# Patient Record
Sex: FEMALE | Race: WHITE | NOT HISPANIC OR LATINO | URBAN - METROPOLITAN AREA
[De-identification: names, ages, dates, MRNs, and addresses within clinical notes are randomized per-mention and may not be internally consistent; named-entity substitution may affect disease eponyms.]

---

## 2020-06-02 ENCOUNTER — TELEPHONE ENCOUNTER (OUTPATIENT)
Dept: URBAN - METROPOLITAN AREA CLINIC 23 | Facility: CLINIC | Age: 45
End: 2020-06-02

## 2020-06-02 RX ORDER — OMEPRAZOLE 40 MG/1
1 CAPSULE 30 MINUTES BEFORE MORNING MEAL CAPSULE, DELAYED RELEASE ORAL ONCE A DAY
Qty: 30 | OUTPATIENT
Start: 2020-06-03

## 2020-06-09 ENCOUNTER — TELEPHONE ENCOUNTER (OUTPATIENT)
Dept: URBAN - METROPOLITAN AREA CLINIC 23 | Facility: CLINIC | Age: 45
End: 2020-06-09

## 2020-06-27 ENCOUNTER — TELEPHONE ENCOUNTER (OUTPATIENT)
Dept: URBAN - METROPOLITAN AREA CLINIC 92 | Facility: CLINIC | Age: 45
End: 2020-06-27

## 2020-06-27 RX ORDER — PLECANATIDE 3 MG/1
1 TABLET TABLET ORAL ONCE A DAY
Qty: 90 | Refills: 1 | OUTPATIENT
Start: 2020-06-27

## 2020-07-08 ENCOUNTER — OFFICE VISIT (OUTPATIENT)
Dept: URBAN - METROPOLITAN AREA TELEHEALTH 2 | Facility: TELEHEALTH | Age: 45
End: 2020-07-08
Payer: COMMERCIAL

## 2020-07-08 DIAGNOSIS — K21.9 GERD: ICD-10-CM

## 2020-07-08 DIAGNOSIS — K59.01 CONSTIPATION: ICD-10-CM

## 2020-07-08 DIAGNOSIS — K62.89 ANAL PAIN: ICD-10-CM

## 2020-07-08 DIAGNOSIS — K60.2 ANAL FISSURE: ICD-10-CM

## 2020-07-08 PROCEDURE — 1036F TOBACCO NON-USER: CPT | Performed by: INTERNAL MEDICINE

## 2020-07-08 PROCEDURE — G9903 PT SCRN TBCO ID AS NON USER: HCPCS | Performed by: INTERNAL MEDICINE

## 2020-07-08 PROCEDURE — 99213 OFFICE O/P EST LOW 20 MIN: CPT | Performed by: INTERNAL MEDICINE

## 2020-07-08 PROCEDURE — G8420 CALC BMI NORM PARAMETERS: HCPCS | Performed by: INTERNAL MEDICINE

## 2020-07-08 PROCEDURE — G8427 DOCREV CUR MEDS BY ELIG CLIN: HCPCS | Performed by: INTERNAL MEDICINE

## 2020-07-08 RX ORDER — PLECANATIDE 3 MG/1
1 TABLET TABLET ORAL ONCE A DAY
Qty: 90 | Refills: 1 | Status: ACTIVE | COMMUNITY
Start: 2020-06-27

## 2020-07-08 RX ORDER — DEXLANSOPRAZOLE 60 MG/1
TAKE 1 CAPSULE (60 MG) BY ORAL ROUTE ONCE DAILY CAPSULE, DELAYED RELEASE ORAL 1
Qty: 90 | Refills: 3 | Status: ACTIVE | COMMUNITY
Start: 2020-05-28

## 2020-07-08 RX ORDER — OMEPRAZOLE 40 MG/1
1 CAPSULE 30 MINUTES BEFORE MORNING MEAL CAPSULE, DELAYED RELEASE ORAL ONCE A DAY
Qty: 30 | Status: ACTIVE | COMMUNITY
Start: 2020-06-03

## 2020-07-08 RX ORDER — EVE PRIMROSE/LINOLEIC/G-LENIC 1000 MG
CAPSULE ORAL
Qty: 0 | Refills: 0 | Status: ACTIVE | COMMUNITY
Start: 1900-01-01

## 2020-07-08 RX ORDER — FLUTICASONE PROPIONATE 50 UG/1
SPRAY, METERED NASAL
Qty: 0 | Refills: 0 | Status: ACTIVE | COMMUNITY
Start: 1900-01-01

## 2020-07-08 RX ORDER — OMEPRAZOLE 40 MG/1
1 CAPSULE 30 MINUTES BEFORE MORNING MEAL CAPSULE, DELAYED RELEASE ORAL ONCE A DAY
Qty: 90 | Refills: 1 | OUTPATIENT
Start: 2020-07-08

## 2020-07-08 RX ORDER — LUBIPROSTONE 24 UG/1
1 CAPSULE WITH FOOD AND WATER CAPSULE, GELATIN COATED ORAL TWICE A DAY
Qty: 180 | Refills: 1 | OUTPATIENT
Start: 2020-07-08

## 2020-07-08 RX ORDER — LUBIPROSTONE 24 UG/1
TAKE 1 CAPSULE (24 MCG) BY ORAL ROUTE 2 TIMES PER DAY WITH FOOD AND WATER CAPSULE, GELATIN COATED ORAL 2
Qty: 60 | Refills: 0 | Status: ACTIVE | COMMUNITY
Start: 2020-05-28

## 2020-07-08 RX ORDER — DOCUSATE SODIUM 100 MG/1
CAPSULE ORAL
Qty: 0 | Refills: 0 | Status: ACTIVE | COMMUNITY
Start: 1900-01-01

## 2020-07-08 RX ORDER — METAXALONE 800 MG
TABLET ORAL
Qty: 0 | Refills: 0 | Status: ACTIVE | COMMUNITY
Start: 1900-01-01

## 2020-07-08 RX ORDER — LINACLOTIDE 145 UG/1
TAKE 1 CAPSULE (145 MCG) BY ORAL ROUTE ONCE DAILY ON AN EMPTY STOMACH AT LEAST 30 MINUTES BEFORE 1ST MEAL OF THE DAY CAPSULE, GELATIN COATED ORAL 1
Qty: 90 | Refills: 0 | Status: ACTIVE | COMMUNITY
Start: 2020-05-26

## 2020-07-08 NOTE — HPI-TODAY'S VISIT:
she went to see Dr. Torres- she had the fissure repair 3.5 weeks ago.  states that things feel much better down there.  she started the amitiza - got a discount coupon which helps.  she feels that the amitiza 24 mcg  is working great for her, she is not having to use any miralax. she is very happy on this medication- she has been taking it for a month.  feels much better.  she is having better emptying.  no constipation.  no abdominal pain or fullness.  she states that on the pirlosec she has control of her reflux. she has to take the pepcid intermittently, as long as she takes the prilosec .  she is keeping track fo her food triggers and avoiding them.  avoiding red sauces.  she has more stress now with school- feels that she is getting some exercise but enough

## 2020-07-24 ENCOUNTER — TELEPHONE ENCOUNTER (OUTPATIENT)
Dept: URBAN - METROPOLITAN AREA CLINIC 23 | Facility: CLINIC | Age: 45
End: 2020-07-24

## 2020-07-24 RX ORDER — OMEPRAZOLE 40 MG/1
1 CAPSULE 30 MINUTES BEFORE MORNING MEAL CAPSULE, DELAYED RELEASE ORAL ONCE A DAY
Qty: 90 | Refills: 1
Start: 2020-07-08

## 2022-08-10 ENCOUNTER — OFFICE VISIT (OUTPATIENT)
Dept: URBAN - METROPOLITAN AREA CLINIC 126 | Facility: CLINIC | Age: 47
End: 2022-08-10
Payer: COMMERCIAL

## 2022-08-10 ENCOUNTER — DASHBOARD ENCOUNTERS (OUTPATIENT)
Age: 47
End: 2022-08-10

## 2022-08-10 VITALS
DIASTOLIC BLOOD PRESSURE: 90 MMHG | HEIGHT: 63 IN | WEIGHT: 193.8 LBS | TEMPERATURE: 97.7 F | HEART RATE: 88 BPM | SYSTOLIC BLOOD PRESSURE: 120 MMHG | BODY MASS INDEX: 34.34 KG/M2

## 2022-08-10 DIAGNOSIS — R10.11 RUQ PAIN: ICD-10-CM

## 2022-08-10 DIAGNOSIS — K21.9 GASTROESOPHAGEAL REFLUX DISEASE, UNSPECIFIED WHETHER ESOPHAGITIS PRESENT: ICD-10-CM

## 2022-08-10 DIAGNOSIS — R14.0 BLOATING: ICD-10-CM

## 2022-08-10 DIAGNOSIS — K58.1 IRRITABLE BOWEL SYNDROME WITH CONSTIPATION: ICD-10-CM

## 2022-08-10 PROCEDURE — 99214 OFFICE O/P EST MOD 30 MIN: CPT | Performed by: NURSE PRACTITIONER

## 2022-08-10 RX ORDER — FUROSEMIDE 20 MG/1
1 TABLET TABLET ORAL ONCE A DAY
Status: ACTIVE | COMMUNITY

## 2022-08-10 RX ORDER — PLECANATIDE 3 MG/1
1 TABLET TABLET ORAL ONCE A DAY
Qty: 90 | Refills: 1 | Status: ON HOLD | COMMUNITY
Start: 2020-06-27

## 2022-08-10 RX ORDER — METAXALONE 800 MG
TABLET ORAL
Qty: 0 | Refills: 0 | Status: ACTIVE | COMMUNITY
Start: 1900-01-01

## 2022-08-10 RX ORDER — FLUTICASONE PROPIONATE 50 UG/1
SPRAY, METERED NASAL
Qty: 0 | Refills: 0 | Status: ACTIVE | COMMUNITY
Start: 1900-01-01

## 2022-08-10 RX ORDER — LINACLOTIDE 145 UG/1
TAKE 1 CAPSULE (145 MCG) BY ORAL ROUTE ONCE DAILY ON AN EMPTY STOMACH AT LEAST 30 MINUTES BEFORE 1ST MEAL OF THE DAY CAPSULE, GELATIN COATED ORAL 1
Qty: 90 | Refills: 0 | Status: ON HOLD | COMMUNITY
Start: 2020-05-26

## 2022-08-10 RX ORDER — EVE PRIMROSE/LINOLEIC/G-LENIC 1000 MG
CAPSULE ORAL
Qty: 0 | Refills: 0 | Status: ON HOLD | COMMUNITY
Start: 1900-01-01

## 2022-08-10 RX ORDER — DEXLANSOPRAZOLE 60 MG/1
TAKE 1 CAPSULE (60 MG) BY ORAL ROUTE ONCE DAILY CAPSULE, DELAYED RELEASE ORAL 1
Qty: 90 | Refills: 3 | Status: ON HOLD | COMMUNITY
Start: 2020-05-28

## 2022-08-10 RX ORDER — OMEPRAZOLE 40 MG/1
1 CAPSULE 30 MINUTES BEFORE MORNING MEAL CAPSULE, DELAYED RELEASE ORAL ONCE A DAY
Qty: 30 | Status: ON HOLD | COMMUNITY
Start: 2020-06-03

## 2022-08-10 RX ORDER — RABEPRAZOLE SODIUM 20 MG/1
1 TABLET TABLET, DELAYED RELEASE ORAL ONCE A DAY
Qty: 30 TABLET | Refills: 2 | OUTPATIENT

## 2022-08-10 RX ORDER — LUBIPROSTONE 24 UG/1
TAKE 1 CAPSULE (24 MCG) BY ORAL ROUTE 2 TIMES PER DAY WITH FOOD AND WATER CAPSULE, GELATIN COATED ORAL 2
Qty: 60 | Refills: 0 | Status: ON HOLD | COMMUNITY
Start: 2020-05-28

## 2022-08-10 RX ORDER — DOCUSATE SODIUM 100 MG/1
CAPSULE ORAL
Qty: 0 | Refills: 0 | Status: ACTIVE | COMMUNITY
Start: 1900-01-01

## 2022-08-10 RX ORDER — OMEPRAZOLE MAGNESIUM 20.6 MG/1
1 TABLET 30 MINUTES BEFORE MORNING MEAL TABLET, DELAYED RELEASE ORAL ONCE A DAY
Status: ACTIVE | COMMUNITY

## 2022-08-10 RX ORDER — OMEPRAZOLE 40 MG/1
1 CAPSULE 30 MINUTES BEFORE MORNING MEAL CAPSULE, DELAYED RELEASE ORAL ONCE A DAY
Qty: 90 | Refills: 1 | Status: ON HOLD | COMMUNITY
Start: 2020-07-08

## 2022-08-10 RX ORDER — SPIRONOLACTONE 50 MG/1
1 TABLET TABLET, FILM COATED ORAL ONCE A DAY
Status: ACTIVE | COMMUNITY

## 2022-08-10 RX ORDER — VALSARTAN AND HYDROCHLOROTHIAZIDE 160; 12.5 MG/1; MG/1
1 TABLET TABLET, FILM COATED ORAL ONCE A DAY
Status: ACTIVE | COMMUNITY

## 2022-08-10 RX ORDER — LUBIPROSTONE 24 UG/1
1 CAPSULE WITH FOOD AND WATER CAPSULE, GELATIN COATED ORAL TWICE A DAY
Qty: 180 | Refills: 1 | Status: ON HOLD | COMMUNITY
Start: 2020-07-08

## 2022-08-10 NOTE — HPI-TODAY'S VISIT:
The patient was referred by Dr. Kane Hurley for reflux, bloating and abdominal pain .   A copy of this document is being forwarded to the referring provider. Very pleasant 47-year-old female seen today for complaints of acid reflux and bloating.  Associated symptoms include abdominal pain.  She does have a history of constipation.  For reflux she has used Dexilant in the past which worked well and symptoms resolved.  However recently symptoms have recurred.  She is taking omeprazole and famotidine and these are not controlling her symptoms.  She complains of bloating and fullness with only a few bites.  She has abdominal pain that can be severe worse in the right upper quadrant and occasionally left lower quadrant that is related to her menstrual cycle.  She has a history of constipation and manages this with MiraLAX and Colace.  This is partially effective.  She has used Amitiza in the past but this caused diarrhea.  Her weight is stable.  She denies overt GI bleeding.

## 2022-08-10 NOTE — PHYSICAL EXAM GASTROINTESTINAL
Abdomen , soft, RUQ tender to palpation, nondistended , no guarding or rigidity , no masses palpable , normal bowel sounds , Liver and Spleen , no hepatomegaly present ,  spleen not palpable

## 2022-08-17 ENCOUNTER — TELEPHONE ENCOUNTER (OUTPATIENT)
Dept: URBAN - METROPOLITAN AREA CLINIC 92 | Facility: CLINIC | Age: 47
End: 2022-08-17

## 2022-08-17 RX ORDER — TENAPANOR HYDROCHLORIDE 53.2 MG/1
1 TABLET IMMEDIATELY BEFORE MEALS TABLET ORAL TWICE A DAY
Qty: 180 TABLET | Refills: 1 | OUTPATIENT
Start: 2022-08-18 | End: 2023-02-13

## 2022-08-17 RX ORDER — RABEPRAZOLE SODIUM 20 MG/1
1 TABLET TABLET, DELAYED RELEASE ORAL ONCE A DAY
Qty: 30 TABLET | Refills: 2

## 2022-08-18 PROBLEM — 235595009: Status: ACTIVE | Noted: 2022-08-10

## 2022-08-18 PROBLEM — 440630006: Status: ACTIVE | Noted: 2022-08-10

## 2022-08-22 ENCOUNTER — TELEPHONE ENCOUNTER (OUTPATIENT)
Dept: URBAN - METROPOLITAN AREA CLINIC 92 | Facility: CLINIC | Age: 47
End: 2022-08-22

## 2022-09-01 ENCOUNTER — TELEPHONE ENCOUNTER (OUTPATIENT)
Dept: URBAN - METROPOLITAN AREA CLINIC 63 | Facility: CLINIC | Age: 47
End: 2022-09-01

## 2022-11-07 ENCOUNTER — TELEPHONE ENCOUNTER (OUTPATIENT)
Dept: URBAN - METROPOLITAN AREA CLINIC 80 | Facility: CLINIC | Age: 47
End: 2022-11-07

## 2023-03-13 ENCOUNTER — TELEPHONE ENCOUNTER (OUTPATIENT)
Dept: URBAN - METROPOLITAN AREA SURGERY CENTER 31 | Facility: SURGERY CENTER | Age: 48
End: 2023-03-13

## 2023-03-13 RX ORDER — RABEPRAZOLE SODIUM 20 MG/1
1 TABLET TABLET, DELAYED RELEASE ORAL ONCE A DAY
Qty: 30 TABLET | Refills: 2

## 2023-06-09 ENCOUNTER — TELEPHONE ENCOUNTER (OUTPATIENT)
Dept: URBAN - METROPOLITAN AREA CLINIC 79 | Facility: CLINIC | Age: 48
End: 2023-06-09

## 2023-06-09 RX ORDER — RABEPRAZOLE SODIUM 20 MG/1
1 TABLET TABLET, DELAYED RELEASE ORAL ONCE A DAY
Qty: 90 | Refills: 0

## 2023-10-19 ENCOUNTER — TELEPHONE ENCOUNTER (OUTPATIENT)
Dept: URBAN - METROPOLITAN AREA CLINIC 79 | Facility: CLINIC | Age: 48
End: 2023-10-19

## 2023-10-19 RX ORDER — RABEPRAZOLE SODIUM 20 MG/1
1 TABLET TABLET, DELAYED RELEASE ORAL ONCE A DAY
Qty: 90 | Refills: 0